# Patient Record
(demographics unavailable — no encounter records)

---

## 2025-07-21 NOTE — PHYSICAL EXAM
[de-identified] : abn excursion [Midline] : trachea located in midline position [Normal] : no rashes

## 2025-07-21 NOTE — DATA REVIEWED
[de-identified] : Hearing Test performed to evaluate the extent of hearing loss and  to explain pt's symptoms  was personally reviewed and revealed Tymps-wnl Hearing- wnl

## 2025-07-21 NOTE — HISTORY OF PRESENT ILLNESS
[de-identified] : 23 yo Chr bilat ear fullness and poss hearing loss May have gotten worse after wisdom teeth were removed no other modifying factors no other nasal or throat complaints h/o adenoidectomy and BMT

## 2025-07-21 NOTE — DATA REVIEWED
[de-identified] : Hearing Test performed to evaluate the extent of hearing loss and  to explain pt's symptoms  was personally reviewed and revealed Tymps-wnl Hearing- wnl

## 2025-07-21 NOTE — HISTORY OF PRESENT ILLNESS
[de-identified] : 23 yo Chr bilat ear fullness and poss hearing loss May have gotten worse after wisdom teeth were removed no other modifying factors no other nasal or throat complaints h/o adenoidectomy and BMT